# Patient Record
Sex: MALE
[De-identification: names, ages, dates, MRNs, and addresses within clinical notes are randomized per-mention and may not be internally consistent; named-entity substitution may affect disease eponyms.]

---

## 2019-01-22 ENCOUNTER — HOSPITAL ENCOUNTER (EMERGENCY)
Dept: HOSPITAL 97 - ER | Age: 44
Discharge: HOME | End: 2019-01-22
Payer: SELF-PAY

## 2019-01-22 DIAGNOSIS — S61.112A: Primary | ICD-10-CM
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
## 2019-01-22 PROCEDURE — 99283 EMERGENCY DEPT VISIT LOW MDM: CPT

## 2019-01-22 NOTE — EDPHYS
Physician Documentation                                                                           

 [facility]                                                                

Name: Ruba Saavedra                                                                           

Age: 43 yrs                                                                                       

Sex: Male                                                                                         

: 1975                                                                                   

MRN: [id number]                                                                                   

Arrival Date: 2019                                                                          

Time: 12:20                                                                                       

Account#: [id number]                                                                            

Bed 12                                                                                            

Private MD: None, None                                                                            

ED Physician Nilesh Collins                                                                       

HPI:                                                                                              

                                                                                             

13:00 This 43 yrs old  Male presents to ER via Ambulatory with complaints of          gs  

      Laceration - THUMB.                                                                         

13:00 The patient or guardian reports injury. The complaints affect the left thumbnail.       gs  

      Context: The problem was sustained at home, resulted from a crush injury. Onset: The        

      symptoms/episode began/occurred 4 week(s) ago. Modifying factors: The symptoms are          

      alleviated by nothing, the symptoms are aggravated by nothing. Associated signs and         

      symptoms: Pertinent positives: BLEEDING. Severity of symptoms: At their worst the           

      symptoms were moderate, in the emergency department the symptoms are unchanged. The         

      patient has experienced a previous episode.                                                 

                                                                                                  

Historical:                                                                                       

- Allergies:                                                                                      

12:39 PENICILLINS;                                                                            ph  

- Home Meds:                                                                                      

12:39 None [Active];                                                                          ph  

- PMHx:                                                                                           

12:39 None;                                                                                   ph  

- PSHx:                                                                                           

12:39 Appendectomy;                                                                           ph  

                                                                                                  

- Immunization history:: Last tetanus immunization: < 10 years ago.                               

- Social history:: Smoking status: Patient/guardian denies using tobacco.                         

- Ebola Screening: : No symptoms or risks identified at this time.                                

                                                                                                  

                                                                                                  

ROS:                                                                                              

13:00 All other systems are negative.                                                         gs  

                                                                                                  

Exam:                                                                                             

13:00 Constitutional: The patient appears alert, awake.                                       gs  

13:00 Musculoskeletal/extremity: Extremities: noted in the left thumbnail: NAILBED APPEARS        

      MACERATED AND NAIL IS DISPLACED SOME BLEEDING. HAVE DISCUSSED WITH DR QUEEN WILL        

      PLACE DRESSING CONTROL BLEEDING SEND TO OFFICE TOMORROW, Circulation is intact in all       

      extremities.                                                                                

13:00 Skin: injury, avulsion(s), a small of the left thumbnail.                                   

13:00 Neuro: Exam negative for acute changes.                                                     

                                                                                                  

Vital Signs:                                                                                      

12:44  / 80; Pulse 84; Resp 18; Temp 97.9(TE); Pulse Ox 97% on R/A; Weight 72.57 kg;    ph  

      Height 5 ft. 1 in. (154.94 cm); Pain 3/10;                                                  

12:44 Body Mass Index 30.23 (72.57 kg, 154.94 cm)                                             ph  

                                                                                                  

MDM:                                                                                              

12:59 Patient medically screened.                                                             gs  

13:00 Data reviewed: vital signs, nurses notes.                                               gs  

                                                                                                  

Administered Medications:                                                                         

No medications were administered                                                                  

                                                                                                  

                                                                                                  

Disposition:                                                                                      

19 13:10 Discharged to Home. Impression: LACERATION OF HAND WITH INJURY TO NAILBED.         

- Condition is Stable.                                                                            

- Discharge Instructions: Nail Bed Injury, Easy-to-Read.                                          

- Prescriptions for Keflex 500 mg Oral Capsule - take 1 capsule by ORAL route every 12            

  hours for 5 days; 10 capsule.                                                                   

- Medication Reconciliation Form, Thank You Letter, Antibiotic Education, Prescription            

  Opioid Use form.                                                                                

- Follow up: Yariel Queen MD; When: Tomorrow.                                                 

                                                                                                  

                                                                                                  

                                                                                                  

Signatures:                                                                                       

Pauly Hutchins RN                      RN   ph                                                   

CollinsNilesh MD MD                                                      

                                                                                                  

Corrections: (The following items were deleted from the chart)                                    

13:41 13:10 2019 13:10 Discharged to Home. Impression: LACERATION OF HAND WITH INJURY   ph  

      TO NAILBED. Condition is Stable. Forms are Medication Reconciliation Form, Thank You        

      Letter, Antibiotic Education, Prescription Opioid Use. Follow up: Yariel Queen; When:     

      Tomorrow.                                                                                 

                                                                                                  

**************************************************************************************************

## 2019-01-22 NOTE — ER
Nurse's Notes                                                                                     

 [facility]                                                                

Name: Ruba Saavedra                                                                           

Age: 43 yrs                                                                                       

Sex: Male                                                                                         

: 1975                                                                                   

MRN: [id number]                                                                                   

Arrival Date: 2019                                                                          

Time: 12:20                                                                                       

Account#: [id number]                                                                            

Bed 12                                                                                            

Private MD: None, None                                                                            

Diagnosis: LACERATION OF HAND WITH INJURY TO NAILBED                                              

                                                                                                  

Presentation:                                                                                     

                                                                                             

12:39 Presenting complaint: Presenting complaint: Patient states: I smashed my thumb over     ph  

      [place] break and it's been bleeding off and on since then, they sent me home from        

      work because the blood filled up my glove." Reports injury to L thumb, bandage              

      currently in place w/ bleeding noted, denies purulent discharge or fever.                   

12:42 Transition of care: patient was not received from another setting of care. Complicating ph  

      Factors: There are no complicating factors for this patient. Onset of symptoms was          

      2018. Risk Assessment: Do you want to hurt yourself or someone else? Patient       

      reports no desire to harm self or others. Initial Sepsis Screen: Does the patient meet      

      any 2 criteria? No. Patient's initial sepsis screen is negative. Does the patient have      

      a suspected source of infection? No. Patient's initial sepsis screen is negative. Care      

      prior to arrival: None.                                                                     

12:42 Method Of Arrival: Ambulatory                                                           ph  

12:42 Acuity: ROBERT 4                                                                           ph  

                                                                                                  

Historical:                                                                                       

- Allergies:                                                                                      

12:39 PENICILLINS;                                                                            ph  

- Home Meds:                                                                                      

12:39 None [Active];                                                                          ph  

- PMHx:                                                                                           

12:39 None;                                                                                   ph  

- PSHx:                                                                                           

12:39 Appendectomy;                                                                           ph  

                                                                                                  

- Immunization history:: Last tetanus immunization: < 10 years ago.                               

- Social history:: Smoking status: Patient/guardian denies using tobacco.                         

- Ebola Screening: : No symptoms or risks identified at this time.                                

                                                                                                  

                                                                                                  

Screenin:00 Abuse screen: Denies threats or abuse. Denies injuries from another. Nutritional        ph  

      screening: No deficits noted. Tuberculosis screening: No symptoms or risk factors           

      identified. Fall Risk None identified.                                                      

                                                                                                  

Assessment:                                                                                       

13:00 General: Appears in no apparent distress. comfortable, well groomed, Behavior is calm,  ph  

      cooperative, appropriate for age, Denies fever. Pain: Complains of pain in left hand.       

      Neuro: Level of Consciousness is awake, alert, obeys commands, Oriented to person,          

      place, time, situation. Cardiovascular: Capillary refill < 3 seconds in bilateral           

      fingers Patient's skin is warm and dry. Respiratory: Airway is patent Respiratory           

      effort is even, unlabored. Derm: Skin is healthy with good turgor, Skin is pink, warm \T\   

      dry. Musculoskeletal: Circulation, motion, and sensation intact. Range of motion:           

      intact in all extremities, Swelling present in dorsal aspect of distal phalanx of left      

      thumb. Injury Description: Avulsion sustained to left thumbnail is partial swelling and     

      bleeding noted was sustained approx 1 month.                                                

                                                                                                  

Vital Signs:                                                                                      

12:44  / 80; Pulse 84; Resp 18; Temp 97.9(TE); Pulse Ox 97% on R/A; Weight 72.57 kg;    ph  

      Height 5 ft. 1 in. (154.94 cm); Pain 3/10;                                                  

12:44 Body Mass Index 30.23 (72.57 kg, 154.94 cm)                                             ph  

                                                                                                  

ED Course:                                                                                        

12:20 Patient arrived in ED.                                                                  sb2 

12:20 None, None is Private Physician.                                                        sb2 

12:42 Pauly Hutchins RN is Primary Nurse.                                                    ph  

12:43 Nilesh Collins MD is Attending Physician.                                              gs  

12:44 Triage completed.                                                                       ph  

12:45 Arm band placed on Patient placed in an exam room.                                      ph  

13:00 Patient has correct armband on for positive identification. Bed in low position. Call   ph  

      light in reach. Side rails up X 1.                                                          

13:08 Yariel Kidd MD is Referral Physician.                                              gs  

13:30 No provider procedures requiring assistance completed. Patient did not have IV access   ph  

      during this emergency room visit. Wound care: to nail avulsion located on left              

      thumbnail and dorsal aspect of distal phalanx of left thumb was cleaned with Hibiclens,     

      irrigated with normal saline, dressed with Surgicel and tube gauze, Patient tolerated       

      well.                                                                                       

                                                                                                  

Administered Medications:                                                                         

No medications were administered                                                                  

                                                                                                  

                                                                                                  

Outcome:                                                                                          

13:10 Discharge ordered by MD.                                                                gs  

13:41 Patient left the ED.                                                                    ph  

13:41 Discharged to home ambulatory.                                                          ph  

13:41 Condition: good                                                                             

13:41 Discharge instructions given to patient, Instructed on discharge instructions, follow       

      up and referral plans. medication usage, Demonstrated understanding of instructions,        

      follow-up care, medications, Prescriptions given X 1.                                       

                                                                                                  

Signatures:                                                                                       

Pauly Hutchins RN                      RN                                                      

Nilesh Collins MD MD                                                      

Ana García                               sb2                                                  

                                                                                                  

Corrections: (The following items were deleted from the chart)                                    

12:44 12:39 Presenting complaint: ph                                                          ph  

                                                                                                  

**************************************************************************************************